# Patient Record
Sex: FEMALE | Race: WHITE | NOT HISPANIC OR LATINO | Employment: FULL TIME | URBAN - METROPOLITAN AREA
[De-identification: names, ages, dates, MRNs, and addresses within clinical notes are randomized per-mention and may not be internally consistent; named-entity substitution may affect disease eponyms.]

---

## 2021-10-11 ENCOUNTER — OFFICE VISIT (OUTPATIENT)
Dept: OBGYN CLINIC | Facility: CLINIC | Age: 46
End: 2021-10-11
Payer: COMMERCIAL

## 2021-10-11 ENCOUNTER — TELEPHONE (OUTPATIENT)
Dept: OBGYN CLINIC | Facility: CLINIC | Age: 46
End: 2021-10-11

## 2021-10-11 VITALS
SYSTOLIC BLOOD PRESSURE: 100 MMHG | HEIGHT: 64 IN | WEIGHT: 179.2 LBS | DIASTOLIC BLOOD PRESSURE: 64 MMHG | BODY MASS INDEX: 30.59 KG/M2

## 2021-10-11 DIAGNOSIS — R22.31 MASS OF RIGHT AXILLA: Primary | ICD-10-CM

## 2021-10-11 DIAGNOSIS — M79.621 PAIN IN RIGHT AXILLA: ICD-10-CM

## 2021-10-11 PROCEDURE — 99212 OFFICE O/P EST SF 10 MIN: CPT | Performed by: NURSE PRACTITIONER

## 2021-10-12 ENCOUNTER — TRANSCRIBE ORDERS (OUTPATIENT)
Dept: SCHEDULING | Age: 46
End: 2021-10-12
Payer: COMMERCIAL

## 2021-10-12 DIAGNOSIS — R22.31 LOCALIZED SWELLING, MASS AND LUMP, RIGHT UPPER LIMB: ICD-10-CM

## 2021-10-12 DIAGNOSIS — M79.621 PAIN IN RIGHT UPPER ARM: ICD-10-CM

## 2022-05-16 ENCOUNTER — TELEPHONE (OUTPATIENT)
Dept: OBGYN CLINIC | Facility: CLINIC | Age: 47
End: 2022-05-16

## 2022-05-16 NOTE — TELEPHONE ENCOUNTER
No h/o abn pap so not related to that, Could be ovarian cyst but normally pain is one sided with that  Also bowel issue is another possibility per chart review had laprscopy in past which noted "matted bowels" If pain worsening to ER for eval Normally do not do wellness with a problem visit

## 2022-05-16 NOTE — TELEPHONE ENCOUNTER
Spoke with patient let her know the message from [place]  Since talking today she has noticed the pain is more left sided  But will keep an eye on it and track of where she feels the pain for the appointment on Wednesday

## 2022-05-16 NOTE — TELEPHONE ENCOUNTER
Blessing l/m requesting c/b to discuss pain on both sides of her pelvic region  Return call to patient, l/m to c/b if needs assistance   Noted appt is scheduled with Carol Heller for 5/16/2022 for pelvic pain  and 6/29/2022 for well annual

## 2022-05-16 NOTE — TELEPHONE ENCOUNTER
Huan Rodolfo called back regarding her pelvic/abdominal pain that she is having  She started with pain in her pelvic area per walter where her "fallopian tubes are"  The pain started a week ago, it comes and goes, pain is 5/10  She is unable to describe the pain sensation that she is having  Sometimes the pain is on one side only, other times its both sides at the same time  She does not have any bleeding, or spotting with these episodes  She maybe has noticed some swelling/bloating to the area  Huan Reynolds is curious as to what could be causing this pain  She did schedule an apt on 5/18 with Fabio Gonzalez, but would like some input on could maybe cause this issue  Fabio Gonzalez,  Please advise, pt was also wondering if her Pap could be done at this visit instead of her well on 6/29 she is worried this pain could be something bad

## 2022-05-18 ENCOUNTER — OFFICE VISIT (OUTPATIENT)
Dept: OBGYN CLINIC | Facility: CLINIC | Age: 47
End: 2022-05-18
Payer: COMMERCIAL

## 2022-05-18 VITALS
BODY MASS INDEX: 31.51 KG/M2 | WEIGHT: 184.6 LBS | HEIGHT: 64 IN | DIASTOLIC BLOOD PRESSURE: 60 MMHG | SYSTOLIC BLOOD PRESSURE: 116 MMHG

## 2022-05-18 DIAGNOSIS — Z12.4 ENCOUNTER FOR SCREENING FOR CERVICAL CANCER: ICD-10-CM

## 2022-05-18 DIAGNOSIS — R10.2 PELVIC PAIN: Primary | ICD-10-CM

## 2022-05-18 PROCEDURE — 99213 OFFICE O/P EST LOW 20 MIN: CPT | Performed by: NURSE PRACTITIONER

## 2022-05-18 NOTE — PROGRESS NOTES
Assessment/Plan:  US to evaluate pelvic pain if normal recommend f/u GI       Diagnoses and all orders for this visit:    Pelvic pain  -     US pelvis complete w transvaginal; Future    Encounter for screening for cervical cancer  -     Cancel: IGP, Aptima HPV, Rfx 16/18,45  -     IGP, Aptima HPV, Rfx 16/18,45          Subjective:      Patient ID: Karina Tolentino is a 40 y o  female  Here for eval pelvic pain Bilat pain x 1-2 week where her "fallopian tubes are"  it comes and goes, pain is 5/10  She is unable to describe the pain sensation that she is having  Sometimes the pain is on one side only, other times its both sides at the same time  Does not seem to be any rhyme or reason  Can feel when rolls over in bed at night Felt in car driving here  She does not have any bleeding, or spotting with these episodes  She maybe has noticed some swelling/bloating to the area Denies fever chills Daily BM no constipation H/o c section x 2 ? Scar tissue  PAP 10/2018 neg/neg No h/o abn pap Requesting a pap be done today H/o vaginismus Not frequently SA      The following portions of the patient's history were reviewed and updated as appropriate: allergies, current medications, past family history, past medical history, past social history, past surgical history and problem list     Review of Systems   Constitutional: Negative for chills and fever  Gastrointestinal: Negative for abdominal pain, constipation and diarrhea  Genitourinary: Positive for pelvic pain  Negative for dyspareunia, dysuria, frequency, menstrual problem and vaginal discharge  Psychiatric/Behavioral: Negative for dysphoric mood  The patient is not nervous/anxious  Objective:      /60 (BP Location: Left arm, Patient Position: Sitting, Cuff Size: Large)   Ht 5' 3 5" (1 613 m)   Wt 83 7 kg (184 lb 9 6 oz)   LMP 04/12/2022 (Approximate)   Breastfeeding No   BMI 32 19 kg/m²          Physical Exam  Vitals and nursing note reviewed  Constitutional:       General: She is not in acute distress  Appearance: Normal appearance  HENT:      Head: Normocephalic and atraumatic  Pulmonary:      Effort: Pulmonary effort is normal    Abdominal:      General: There is no distension  Palpations: Abdomen is soft  There is no mass  Tenderness: There is no abdominal tenderness  Genitourinary:     General: Normal vulva  Exam position: Lithotomy position  Labia:         Right: No rash or lesion  Left: No rash or lesion  Vagina: Normal  No vaginal discharge or erythema  Cervix: No cervical motion tenderness, discharge, lesion or cervical bleeding  Uterus: Not enlarged and not tender  Adnexa:         Right: No mass or tenderness  Left: Fullness present  No mass or tenderness  Rectum: Normal       Comments: PAP from cervix  Lymphadenopathy:      Lower Body: No right inguinal adenopathy  No left inguinal adenopathy  Skin:     General: Skin is warm and dry  Neurological:      General: No focal deficit present  Mental Status: She is alert and oriented to person, place, and time  Psychiatric:         Mood and Affect: Mood normal          Behavior: Behavior normal          Thought Content:  Thought content normal

## 2022-05-23 LAB
CYTOLOGIST CVX/VAG CYTO: NORMAL
DX ICD CODE: NORMAL
HPV I/H RISK 4 DNA CVX QL PROBE+SIG AMP: NEGATIVE
OTHER STN SPEC: NORMAL
PATH REPORT.FINAL DX SPEC: NORMAL
SL AMB NOTE:: NORMAL
SL AMB SPECIMEN ADEQUACY: NORMAL
SL AMB TEST METHODOLOGY: NORMAL

## 2022-05-24 ENCOUNTER — TELEPHONE (OUTPATIENT)
Dept: OBGYN CLINIC | Facility: CLINIC | Age: 47
End: 2022-05-24

## 2022-11-16 ENCOUNTER — TELEPHONE (OUTPATIENT)
Dept: GASTROENTEROLOGY | Facility: CLINIC | Age: 47
End: 2022-11-16

## 2022-11-17 ENCOUNTER — CONSULT (OUTPATIENT)
Dept: GASTROENTEROLOGY | Facility: MEDICAL CENTER | Age: 47
End: 2022-11-17

## 2022-11-17 VITALS
HEART RATE: 74 BPM | WEIGHT: 195 LBS | SYSTOLIC BLOOD PRESSURE: 118 MMHG | BODY MASS INDEX: 33.29 KG/M2 | TEMPERATURE: 97.7 F | DIASTOLIC BLOOD PRESSURE: 72 MMHG | OXYGEN SATURATION: 98 % | HEIGHT: 64 IN

## 2022-11-17 DIAGNOSIS — R10.30 LOWER ABDOMINAL PAIN: Primary | ICD-10-CM

## 2022-11-17 NOTE — PATIENT INSTRUCTIONS
High Fiber Diet   WHAT YOU NEED TO KNOW:   A high-fiber diet includes foods that have a high amount of fiber  Fiber is the part of fruits, vegetables, and grains that is not broken down by your body  Fiber keeps your bowel movements regular  Fiber can also help lower your cholesterol level, control blood sugar in people with diabetes, and relieve constipation  Fiber can also help you control your weight because it helps you feel full faster  Most adults should eat 25 to 35 grams of fiber each day  Talk to your dietitian or healthcare provider about the amount of fiber you need  DISCHARGE INSTRUCTIONS:   Good sources of fiber:       Foods with at least 4 grams of fiber per serving:      ? to ½ cup of high-fiber cereal (check the nutrition label on the box)    ½ cup of blackberries or raspberries    4 dried prunes    1 cooked artichoke    ½ cup of cooked legumes, such as lentils, or red, kidney, and baidr beans    Foods with 1 to 3 grams of fiber per servin slice of whole-wheat, pumpernickel, or rye bread    ½ cup of cooked brown rice    4 whole-wheat crackers    1 cup of oatmeal    ½ cup of cereal with 1 to 3 grams of fiber per serving (check the nutrition label on the box)    1 small piece of fruit, such as an apple, banana, pear, kiwi, or orange    3 dates    ½ cup of canned apricots, fruit cocktail, peaches, or pears    ½ cup of raw or cooked vegetables, such as carrots, cauliflower, cabbage, spinach, squash, or corn    Ways that you can increase fiber in your diet:   Choose brown or wild rice instead of white rice  Use whole wheat flour in recipes instead of white or all-purpose flour  Add beans and peas to casseroles or soups  Choose fresh fruit and vegetables with peels or skins on instead of juices  Other diet guidelines to follow: Add fiber to your diet slowly  You may have abdominal discomfort, bloating, and gas if you add fiber to your diet too quickly       Drink plenty of liquids as you add fiber to your diet  You may have nausea or develop constipation if you do not drink enough water  Ask how much liquid to drink each day and which liquids are best for you  © Copyright [employer] 2022 Information is for End User's use only and may not be sold, redistributed or otherwise used for commercial purposes  All illustrations and images included in CareNotes® are the copyrighted property of [employer]  [facility] Javier [address]ian   The above information is an  only  It is not intended as medical advice for individual conditions or treatments  Talk to your doctor, nurse or pharmacist before following any medical regimen to see if it is safe and effective for you  Scheduled date of colon (as of today) 12/29/22  Physician performing Dr Carmen Singh:  Location of procedure  asc [facility]:   Instructions given to patient  miralax/dulcolax  Clearances: na

## 2022-11-17 NOTE — PROGRESS NOTES
Assessment/Plan:     Diagnoses and all orders for this visit:    Lower abdominal pain  Patient describes lower abdominal pain for the past 5-6 months  She states it is sporadic, last approximately 15-30 minutes and then resolve spontaneously  It does not seem to be related to eating or bowel movements  I did encourage her to get the pelvic ultrasound that was previously ordered  She is also very concerned about colon cancer given her father's history  Will also schedule colonoscopy at this time, risks and benefits were discussed  We also discussed Bentyl however feel be limited benefit as the pain resolved so quickly, she also does not wish to take medication  Will see her back after to discuss all results  -     Colonoscopy; Future          Subjective:      Patient ID: David Interiano is a 56 y o  female  HPI     Patient was referred for lower abdominal pain and a screening colonoscopy  She states the pain started 5-6 months ago  She saw her OB gyn who recommended a pelvic ultrasound however this has not been performed  She states it is random  It does not seem to be related to eating  Typically can last anywhere from 15 minutes to half an hour and then resolves on its own  She states she has a normal formed daily bowel movement  Her father did have colon polyps, she is unclear if he then developed colon cancer  She is never had a screening colonoscopy  She denies any heartburn or reflux symptoms  Patient Active Problem List   Diagnosis   • Lower abdominal pain     Allergies   Allergen Reactions   • Chlorhexidine Rash     No current outpatient medications on file prior to visit  No current facility-administered medications on file prior to visit       Family History   Problem Relation Age of Onset   • Lung cancer Mother    • Aneurysm Mother    • Colon polyps Father    • No Known Problems Maternal Grandmother    • No Known Problems Maternal Grandfather    • No Known Problems Paternal Grandmother    • No Known Problems Paternal Grandfather    • Breast cancer Maternal Aunt    • No Known Problems Daughter    • Uterine cancer Neg Hx    • Ovarian cancer Neg Hx    • Colon cancer Neg Hx      Past Medical History:   Diagnosis Date   • Anxiety    • Breast cyst, right    • Family history of brain aneurysm     saw neurology    • Ventral hernia     per Dr Tushar Jo     Social History     Socioeconomic History   • Marital status: /Civil Union     Spouse name: None   • Number of children: None   • Years of education: None   • Highest education level: None   Occupational History   • None   Tobacco Use   • Smoking status: Never   • Smokeless tobacco: Never   Vaping Use   • Vaping Use: Never used   Substance and Sexual Activity   • Alcohol use: Yes     Comment: social   • Drug use: Never   • Sexual activity: Yes     Partners: Male     Birth control/protection: None     Comment: no new partner in past year   Other Topics Concern   • None   Social History Narrative   • None     Social Determinants of Health     Financial Resource Strain: Not on file   Food Insecurity: Not on file   Transportation Needs: Not on file   Physical Activity: Not on file   Stress: Not on file   Social Connections: Not on file   Intimate Partner Violence: Not on file   Housing Stability: Not on file     Past Surgical History:   Procedure Laterality Date   •  SECTION     •  SECTION  2018   • CHOLECYSTECTOMY  2016   • HYSTEROSCOPY  2017   • LAPAROSCOPY      for pelvic pain "no endometriosis, but intestines matted"   • MAMMO (HISTORICAL) Bilateral 2018    [facility] BI-RADS 1  Negative  Scattered fibroglandular densitys 25% to 50 % glandular breast tissue         Review of Systems   Constitutional: Negative for fever  Gastrointestinal: Positive for abdominal pain  Negative for constipation, diarrhea, nausea and vomiting  Genitourinary: Negative for dysuria, frequency and hematuria     Musculoskeletal: Positive for arthralgias and myalgias  Neurological: Positive for headaches  All other systems reviewed and are negative  Objective:      /72   Pulse 74   Temp 97 7 °F (36 5 °C)   Ht 5' 3 5" (1 613 m)   Wt 88 5 kg (195 lb)   SpO2 98%   BMI 34 00 kg/m²          Physical Exam  Constitutional:       Appearance: Normal appearance  She is well-developed and well-nourished  HENT:      Head: Normocephalic and atraumatic  Eyes:      Extraocular Movements: EOM normal       Conjunctiva/sclera: Conjunctivae normal    Cardiovascular:      Rate and Rhythm: Normal rate and regular rhythm  Pulmonary:      Effort: Pulmonary effort is normal       Breath sounds: Normal breath sounds  Abdominal:      General: Bowel sounds are normal  There is no distension  Palpations: Abdomen is soft  Tenderness: There is no abdominal tenderness  Musculoskeletal:         General: Normal range of motion  Cervical back: Normal range of motion  Skin:     General: Skin is warm and dry  Neurological:      Mental Status: She is alert and oriented to person, place, and time     Psychiatric:         Mood and Affect: Mood and affect and mood normal          Behavior: Behavior normal

## 2022-12-06 ENCOUNTER — TELEPHONE (OUTPATIENT)
Dept: GASTROENTEROLOGY | Facility: CLINIC | Age: 47
End: 2022-12-06

## 2022-12-07 ENCOUNTER — TELEPHONE (OUTPATIENT)
Dept: GASTROENTEROLOGY | Facility: CLINIC | Age: 47
End: 2022-12-07

## 2022-12-07 NOTE — TELEPHONE ENCOUNTER
Pt called to r/s procedure for 12/29/ Procedure has been r/s to 1/17/23 with Dr Zarina Hunt   Pt already has instructions

## 2023-01-17 ENCOUNTER — TELEPHONE (OUTPATIENT)
Dept: GASTROENTEROLOGY | Facility: CLINIC | Age: 48
End: 2023-01-17

## 2023-02-13 ENCOUNTER — TELEPHONE (OUTPATIENT)
Dept: OBGYN CLINIC | Facility: CLINIC | Age: 48
End: 2023-02-13

## 2023-02-13 NOTE — TELEPHONE ENCOUNTER
Eric ramirez/anibal she was given an order for pelvic u/s  She did not go originally but would like to go now  Asking if order is still valid  Return call to Eric Fraga, Return call to patient, advised pelvic u/s order is still valid   Provided # for [facility] central scheduling

## 2023-02-17 ENCOUNTER — TELEPHONE (OUTPATIENT)
Dept: GASTROENTEROLOGY | Facility: CLINIC | Age: 48
End: 2023-02-17

## 2023-02-17 DIAGNOSIS — R10.30 LOWER ABDOMINAL PAIN: Primary | ICD-10-CM

## 2023-02-17 RX ORDER — SODIUM PICOSULFATE, MAGNESIUM OXIDE, AND ANHYDROUS CITRIC ACID 10; 3.5; 12 MG/160ML; G/160ML; G/160ML
LIQUID ORAL
Qty: 320 ML | Refills: 0 | Status: SHIPPED | COMMUNITY
Start: 2023-02-17

## 2023-02-17 NOTE — TELEPHONE ENCOUNTER
Spoke with pt  Confirmed proc  Pt wanted to discuss prep options  She was a little nervous about the Miralax/Dulcolax prep working but did not want the Golytely  Gave pt clenpiq sample and new instructions  She will pick them up in the office

## 2023-02-19 ENCOUNTER — HOSPITAL ENCOUNTER (OUTPATIENT)
Dept: ULTRASOUND IMAGING | Facility: HOSPITAL | Age: 48
Discharge: HOME | End: 2023-02-19

## 2023-02-19 DIAGNOSIS — R10.2 PELVIC PAIN: ICD-10-CM

## 2023-02-27 ENCOUNTER — ANESTHESIA EVENT (OUTPATIENT)
Dept: GASTROENTEROLOGY | Facility: AMBULATORY SURGERY CENTER | Age: 48
End: 2023-02-27

## 2023-02-27 ENCOUNTER — HOSPITAL ENCOUNTER (OUTPATIENT)
Dept: GASTROENTEROLOGY | Facility: AMBULATORY SURGERY CENTER | Age: 48
Discharge: HOME | End: 2023-02-27

## 2023-02-27 ENCOUNTER — NURSE TRIAGE (OUTPATIENT)
Dept: OTHER | Facility: OTHER | Age: 48
End: 2023-02-27

## 2023-02-27 ENCOUNTER — ANESTHESIA (OUTPATIENT)
Dept: GASTROENTEROLOGY | Facility: AMBULATORY SURGERY CENTER | Age: 48
End: 2023-02-27

## 2023-02-27 VITALS
HEART RATE: 67 BPM | WEIGHT: 195 LBS | TEMPERATURE: 98.6 F | HEIGHT: 64 IN | RESPIRATION RATE: 24 BRPM | BODY MASS INDEX: 33.29 KG/M2 | DIASTOLIC BLOOD PRESSURE: 54 MMHG | OXYGEN SATURATION: 97 % | SYSTOLIC BLOOD PRESSURE: 97 MMHG

## 2023-02-27 DIAGNOSIS — R10.30 LOWER ABDOMINAL PAIN: ICD-10-CM

## 2023-02-27 LAB
EXT PREGNANCY TEST URINE: NEGATIVE
EXT. CONTROL: NORMAL

## 2023-02-27 RX ORDER — LIDOCAINE HYDROCHLORIDE 10 MG/ML
INJECTION, SOLUTION EPIDURAL; INFILTRATION; INTRACAUDAL; PERINEURAL AS NEEDED
Status: DISCONTINUED | OUTPATIENT
Start: 2023-02-27 | End: 2023-02-27

## 2023-02-27 RX ORDER — PROPOFOL 10 MG/ML
INJECTION, EMULSION INTRAVENOUS AS NEEDED
Status: DISCONTINUED | OUTPATIENT
Start: 2023-02-27 | End: 2023-02-27

## 2023-02-27 RX ORDER — SODIUM CHLORIDE, SODIUM LACTATE, POTASSIUM CHLORIDE, CALCIUM CHLORIDE 600; 310; 30; 20 MG/100ML; MG/100ML; MG/100ML; MG/100ML
50 INJECTION, SOLUTION INTRAVENOUS CONTINUOUS
Status: DISCONTINUED | OUTPATIENT
Start: 2023-02-27 | End: 2023-03-03 | Stop reason: HOSPADM

## 2023-02-27 RX ADMIN — PROPOFOL 20 MG: 10 INJECTION, EMULSION INTRAVENOUS at 10:46

## 2023-02-27 RX ADMIN — PROPOFOL 150 MG: 10 INJECTION, EMULSION INTRAVENOUS at 10:35

## 2023-02-27 RX ADMIN — PROPOFOL 20 MG: 10 INJECTION, EMULSION INTRAVENOUS at 10:50

## 2023-02-27 RX ADMIN — PROPOFOL 20 MG: 10 INJECTION, EMULSION INTRAVENOUS at 10:44

## 2023-02-27 RX ADMIN — SODIUM CHLORIDE, SODIUM LACTATE, POTASSIUM CHLORIDE, CALCIUM CHLORIDE 50 ML/HR: 600; 310; 30; 20 INJECTION, SOLUTION INTRAVENOUS at 10:22

## 2023-02-27 RX ADMIN — PROPOFOL 20 MG: 10 INJECTION, EMULSION INTRAVENOUS at 10:41

## 2023-02-27 RX ADMIN — PROPOFOL 20 MG: 10 INJECTION, EMULSION INTRAVENOUS at 10:48

## 2023-02-27 RX ADMIN — LIDOCAINE HYDROCHLORIDE 50 MG: 10 INJECTION, SOLUTION EPIDURAL; INFILTRATION; INTRACAUDAL; PERINEURAL at 10:35

## 2023-02-27 RX ADMIN — PROPOFOL 30 MG: 10 INJECTION, EMULSION INTRAVENOUS at 10:38

## 2023-02-27 NOTE — ANESTHESIA PREPROCEDURE EVALUATION
Procedure:  COLONOSCOPY    Relevant Problems   NEURO/PSYCH   (+) Anxiety        Physical Exam    Airway    Mallampati score: II  TM Distance: >3 FB  Neck ROM: full     Dental   No notable dental hx     Cardiovascular      Pulmonary      Other Findings        Anesthesia Plan  ASA Score- 1     Anesthesia Type- IV sedation with anesthesia with ASA Monitors  Additional Monitors:   Airway Plan:           Plan Factors-    Chart reviewed  Patient summary reviewed  Patient is not a current smoker  Induction- intravenous  Postoperative Plan-     Informed Consent- Anesthetic plan and risks discussed with patient  I personally reviewed this patient with the CRNA  Discussed and agreed on the Anesthesia Plan with the CRNA  Nivia Cushing

## 2023-02-27 NOTE — TELEPHONE ENCOUNTER
Patient states that she did not get all 5 glasses of water in this morning  patient was advised to stop drinking as her apt time is for 1015 and prep should be completed 4 hours prior to her procedure  Last glass of water she had was at [facility]  Patient also states that no on called her to tell her what time her arrival time was  Office please advise  Reason for Disposition  • RN needs further essential information from caller in order to complete triage    Answer Assessment - Initial Assessment Questions  1  REASON FOR CALL or QUESTION:      Patient states that she did not get all 5 glasses of water in this morning  patient was advised to stop drinking as her apt time is for 1015 and prep should be completed 4 hours prior to her procedure  Last glass of water she had was at [facility]  Patient also states that no on called her to tell her what time her arrival time was      Protocols used: INFORMATION ONLY CALL - NO TRIAGE-ADULT-

## 2023-02-27 NOTE — TELEPHONE ENCOUNTER
I asked Reginaldo Meade RN at [place] [id number] to talk to patient-Ann confirmed she would return pt's call

## 2023-02-27 NOTE — ANESTHESIA POSTPROCEDURE EVALUATION
Post-Op Assessment Note    CV Status:  Stable  Pain Score: 0    Pain management: adequate     Mental Status:  Sleepy   Hydration Status:  Euvolemic   PONV Controlled:  None   Airway Patency:  Patent      Post Op Vitals Reviewed: Yes      Staff: Anesthesiologist, CRNA         No notable events documented      BP   125/76   Temp      Pulse  74   Resp   12   SpO2   100

## 2023-03-01 ENCOUNTER — TELEPHONE (OUTPATIENT)
Dept: OBGYN CLINIC | Facility: CLINIC | Age: 48
End: 2023-03-01

## 2023-03-01 DIAGNOSIS — R93.5 ABNORMAL ULTRASOUND OF ENDOMETRIUM: Primary | ICD-10-CM

## 2023-03-01 NOTE — TELEPHONE ENCOUNTER
Called pt to discuss US ordered in May  She states she has still been having pain  She underwent a colonoscopy which was normal with the exception of a small polyp  She had described the pain in her ovaries intermittent  She proceeded with US Ovaries are normal Uterus normal with the exception of heterogenous endometrium for which they recommended repeat US in 6-8 weeks  Informed pt best time to get US is day 7-10 of menstrual cycle  Lining is not thickened at 9 mm  She notes periods are monthly Last 2 days and over  Denies bleeding between periods   Pt very anxious Discussed endometrial biopsy which she would like to proceed with Order placed for repeat US

## 2023-03-02 ENCOUNTER — TELEPHONE (OUTPATIENT)
Dept: OBGYN CLINIC | Facility: CLINIC | Age: 48
End: 2023-03-02

## 2023-03-02 NOTE — TELEPHONE ENCOUNTER
Blessing ramirez/anibal Wednesday evening has questions about EMB  Return call to Rene Ravi, answered questions pertaining to difference of pap and EMB  Encouraged to take motrin 600mg one hour prior to procedure  Eat light breakfast or lunch, want her to have had something to eat day of procedure  Patient verbalized understanding  She would also like to move appt to a sooner date if possible due to feeling anxious  Transferred to reception to assist with appointment

## 2023-03-02 NOTE — PROGRESS NOTES
Assessment/Plan:    Lower abdominal pain  55 yo  with complaints of intermittent lower abdominal pain since 2023  Was seen by GI in 2022 with records reviewed and  description of pain lasting 15-30 minutes with spontaneous resolution not related to eating or bowels  Normal colonoscopy 2023  Evaluated by our NP in 2023 with description of pain as both uni and bilateral at times when rolling over in bed  Of note is 30 lb weight gain since   Upsetting to pt, attributes to stress eating with youngest child's cardiac surgery  Describes pain as dull, mild and intermittent  No nausea, vomiting, diarrhea, constipation, blood in stool, hematuria, constant urinary pressure, early satiety,    Reviewed films with pt of pelvic u/s showing normal uterus with 9 mm heterogeneous EMS with small insignificant calcification  Normal ovaries bilaterally  Pt with normal, light, monthly cycles  Etiology of pain difficult to ascertain  Doubt gyn or gi as source with clinical picture and normal testing  Doubt endometriosis as pain started one year ago, would expect earlier symptoms from this 56year old with normal cycles  Pain most consistent with musculoskeletal pain  Exam significant for tenderness on bilateral SI joints  Recommend comfort measures (heat, positioning) and NSAIDs with PCP evaluation for possible imagining and referral to PT or pain management  Endometrial thickening on ultrasound  Images reviewed with tiny endometrial calcification and 9 mm EMS with normal cycles  No indication for biopsy at this time  Will repeat u/s in 3 months and reassess need for intervention at that time  Orders given         Diagnoses and all orders for this visit:    Negative pregnancy test  -     POCT urine HCG    Endometrial thickening on ultrasound  -     US pelvis complete w transvaginal; Future    Encounter for screening mammogram for malignant neoplasm of breast  -     Mammo screening bilateral w 3d & cad; Future    Lower abdominal pain    Other orders  -     Liquid-based pap, screening          Subjective:      Patient ID: Uday Miller is a 56 y o  female  HPI here with abdominal pain and to review imaging  The following portions of the patient's history were reviewed and updated as appropriate:   She  has a past medical history of Anxiety, Breast cyst, right, Family history of brain aneurysm, and Ventral hernia  She   Patient Active Problem List    Diagnosis Date Noted   • Endometrial thickening on ultrasound 2023   • Anxiety    • Lower abdominal pain 2022     She  has a past surgical history that includes Mammo (historical) (Bilateral, 2018);  section (); Cholecystectomy (); Hysteroscopy ();  section (); LAPAROSCOPY; and Colonoscopy (2023)  Her family history includes Aneurysm in her mother; Breast cancer in her maternal aunt; Colon polyps in her father; Lung cancer in her mother; No Known Problems in her daughter, maternal grandfather, maternal grandmother, paternal grandfather, and paternal grandmother  She  reports that she has never smoked  She has never used smokeless tobacco  She reports current alcohol use  She reports that she does not use drugs  No current outpatient medications on file  No current facility-administered medications for this visit  She is allergic to chlorhexidine       Review of Systems  see above    Objective:      BP 90/62   Ht 5' 3 5" (1 613 m)   Wt 89 7 kg (197 lb 12 8 oz)   LMP 2023 (Approximate)   BMI 34 49 kg/m²          Physical Exam    General appearance: no distress, pleasant, anxious  Abdomen: soft, non tender, no palpable masses, well healed pfannenstiel scar   No guarding or rebound  Back: tender over bilateral SI joints with minimal pressure  Pelvic exam: **sensitive with exam and spec**  normal external genitalia, right labia majora 1 cm inclusion cyst, mobile, urethral meatus normal, vagina without lesions,  cervix without lesions, no CMT,  uterus small, non tender, no adnexal masses, non tender  Rectal exam: deferred    DATA: 2/19/23 u/s with AV uterus 9 7 cm with 9 mm heterogeneous with small calcification EMS   R ov 3 and L ov 2 8 cm; both WNL

## 2023-03-03 ENCOUNTER — OFFICE VISIT (OUTPATIENT)
Dept: OBGYN CLINIC | Facility: CLINIC | Age: 48
End: 2023-03-03

## 2023-03-03 VITALS
HEIGHT: 64 IN | SYSTOLIC BLOOD PRESSURE: 90 MMHG | WEIGHT: 197.8 LBS | DIASTOLIC BLOOD PRESSURE: 62 MMHG | BODY MASS INDEX: 33.77 KG/M2

## 2023-03-03 DIAGNOSIS — Z32.02 NEGATIVE PREGNANCY TEST: ICD-10-CM

## 2023-03-03 DIAGNOSIS — R93.89 ENDOMETRIAL THICKENING ON ULTRASOUND: ICD-10-CM

## 2023-03-03 DIAGNOSIS — Z12.31 ENCOUNTER FOR SCREENING MAMMOGRAM FOR MALIGNANT NEOPLASM OF BREAST: ICD-10-CM

## 2023-03-03 DIAGNOSIS — R10.30 LOWER ABDOMINAL PAIN: Primary | ICD-10-CM

## 2023-03-03 LAB — SL AMB POCT URINE HCG: NEGATIVE

## 2023-03-03 NOTE — ASSESSMENT & PLAN NOTE
57 yo  with complaints of intermittent lower abdominal pain since 2023  Was seen by GI in 2022 with records reviewed and  description of pain lasting 15-30 minutes with spontaneous resolution not related to eating or bowels  Normal colonoscopy 2023  Evaluated by our NP in 2023 with description of pain as both uni and bilateral at times when rolling over in bed  Of note is 30 lb weight gain since   Upsetting to pt, attributes to stress eating with youngest child's cardiac surgery  Describes pain as dull, mild and intermittent  No nausea, vomiting, diarrhea, constipation, blood in stool, hematuria, constant urinary pressure, early satiety,    Reviewed films with pt of pelvic u/s showing normal uterus with 9 mm heterogeneous EMS with small insignificant calcification  Normal ovaries bilaterally  Pt with normal, light, monthly cycles  Etiology of pain difficult to ascertain  Doubt gyn or gi as source with clinical picture and normal testing  Doubt endometriosis as pain started one year ago, would expect earlier symptoms from this 56year old with normal cycles  Pain most consistent with musculoskeletal pain  Exam significant for tenderness on bilateral SI joints  Recommend comfort measures (heat, positioning) and NSAIDs with PCP evaluation for possible imagining and referral to PT or pain management

## 2023-03-03 NOTE — LETTER
March 3, 2023     Jaimie Matthew MD  [id number]  [facility] [address]    Patient: Lianna Jose   YOB: 1975   Date of Visit: 3/3/2023       Dear Dr Lady Washington: Thank you for referring Clifford Moulton to me for evaluation  Below are my notes for this consultation  If you have questions, please do not hesitate to call me  I look forward to following your patient along with you  Sincerely,        Moreno Naidu MD        CC: No Recipients  Moreno Naidu MD  3/3/2023  4:39 PM  Sign when Signing Visit  Assessment/Plan:    Lower abdominal pain  55 yo  with complaints of intermittent lower abdominal pain since 2023  Was seen by GI in 2022 with records reviewed and  description of pain lasting 15-30 minutes with spontaneous resolution not related to eating or bowels  Normal colonoscopy 2023  Evaluated by our NP in 2023 with description of pain as both uni and bilateral at times when rolling over in bed  Of note is 30 lb weight gain since   Upsetting to pt, attributes to stress eating with youngest child's cardiac surgery  Describes pain as dull, mild and intermittent  No nausea, vomiting, diarrhea, constipation, blood in stool, hematuria, constant urinary pressure, early satiety,    Reviewed films with pt of pelvic u/s showing normal uterus with 9 mm heterogeneous EMS with small insignificant calcification  Normal ovaries bilaterally  Pt with normal, light, monthly cycles  Etiology of pain difficult to ascertain  Doubt gyn or gi as source with clinical picture and normal testing  Doubt endometriosis as pain started one year ago, would expect earlier symptoms from this 56year old with normal cycles  Pain most consistent with musculoskeletal pain  Exam significant for tenderness on bilateral SI joints  Recommend comfort measures (heat, positioning) and NSAIDs with PCP evaluation for possible imagining and referral to PT or pain management  Endometrial thickening on ultrasound  Images reviewed with tiny endometrial calcification and 9 mm EMS with normal cycles  No indication for biopsy at this time  Will repeat u/s in 3 months and reassess need for intervention at that time  Orders given  Diagnoses and all orders for this visit:    Negative pregnancy test  -     POCT urine HCG    Endometrial thickening on ultrasound  -     US pelvis complete w transvaginal; Future    Encounter for screening mammogram for malignant neoplasm of breast  -     Mammo screening bilateral w 3d & cad; Future    Lower abdominal pain    Other orders  -     Liquid-based pap, screening         Subjective:     Patient ID: Ochoa Norman is a 56 y o  female  HPI here with abdominal pain and to review imaging  The following portions of the patient's history were reviewed and updated as appropriate:   She  has a past medical history of Anxiety, Breast cyst, right, Family history of brain aneurysm, and Ventral hernia  She   Patient Active Problem List    Diagnosis Date Noted   • Endometrial thickening on ultrasound 2023   • Anxiety    • Lower abdominal pain 2022     She  has a past surgical history that includes Mammo (historical) (Bilateral, 2018);  section (); Cholecystectomy (); Hysteroscopy ();  section (); LAPAROSCOPY; and Colonoscopy (2023)  Her family history includes Aneurysm in her mother; Breast cancer in her maternal aunt; Colon polyps in her father; Lung cancer in her mother; No Known Problems in her daughter, maternal grandfather, maternal grandmother, paternal grandfather, and paternal grandmother  She  reports that she has never smoked  She has never used smokeless tobacco  She reports current alcohol use  She reports that she does not use drugs  No current outpatient medications on file  No current facility-administered medications for this visit       She is allergic to chlorhexidine       Review of Systems see above    Objective:      BP 90/62   Ht 5' 3 5" (1 613 m)   Wt 89 7 kg (197 lb 12 8 oz)   LMP 02/01/2023 (Approximate)   BMI 34 49 kg/m²         Physical Exam   General appearance: no distress, pleasant, anxious  Abdomen: soft, non tender, no palpable masses, well healed pfannenstiel scar  No guarding or rebound  Back: tender over bilateral SI joints with minimal pressure  Pelvic exam: **sensitive with exam and spec**  normal external genitalia, right labia majora 1 cm inclusion cyst, mobile, urethral meatus normal, vagina without lesions,  cervix without lesions, no CMT,  uterus small, non tender, no adnexal masses, non tender  Rectal exam: deferred    DATA: 2/19/23 u/s with AV uterus 9 7 cm with 9 mm heterogeneous with small calcification EMS   R ov 3 and L ov 2 8 cm; both WNL

## 2023-03-03 NOTE — ASSESSMENT & PLAN NOTE
Images reviewed with tiny endometrial calcification and 9 mm EMS with normal cycles  No indication for biopsy at this time  Will repeat u/s in 3 months and reassess need for intervention at that time  Orders given

## 2023-05-01 DIAGNOSIS — Z12.31 ENCOUNTER FOR SCREENING MAMMOGRAM FOR MALIGNANT NEOPLASM OF BREAST: ICD-10-CM

## 2023-06-20 ENCOUNTER — TELEPHONE (OUTPATIENT)
Dept: OBGYN CLINIC | Facility: CLINIC | Age: 48
End: 2023-06-20

## 2023-06-20 DIAGNOSIS — Z12.31 ENCOUNTER FOR SCREENING MAMMOGRAM FOR MALIGNANT NEOPLASM OF BREAST: Primary | ICD-10-CM

## 2023-06-20 NOTE — TELEPHONE ENCOUNTER
Thank you. Glad the pain has resolved. She should schedule a mammogram (due and order placed) and schedule a well check (DK or me) in the next 3-4 months.   Please send mammo order to her.   Thanks.

## 2023-06-20 NOTE — TELEPHONE ENCOUNTER
6/20/23-Pt states she does not need this f/u appt.  Her original symptom (pain) has not returned and she was not able to schedule the f/u US. She will certainly follow thru with the original orders and RS this appt if you want her to.  Pls advise.

## 2023-12-11 ENCOUNTER — TELEPHONE (OUTPATIENT)
Dept: OBGYN CLINIC | Facility: CLINIC | Age: 48
End: 2023-12-11

## 2023-12-11 NOTE — TELEPHONE ENCOUNTER
Blessing ramirez/anibal asking when her prior mammogram was done. Return call to Linsey Gallego, previous mammo done 4/15/2023. She reports she is having slight bilateral breast discomfort. Has had same in past and mammo was normal. Denies breast lumps,redness. One breast larger than other whch is normal for her. H/O fibroglandular densities. Reports increased caffeine intake. Offered breast exam and encourgaed to schedule well annual.  She is in agreement to schedule well annual. Will consider breast check appointment if discomfort does not resolve or develops any new concerns.  Transferred to reception to schedule well annual and option for breast check exam.

## 2024-03-19 PROBLEM — Z01.419 ENCOUNTER FOR GYNECOLOGICAL EXAMINATION (GENERAL) (ROUTINE) WITHOUT ABNORMAL FINDINGS: Status: ACTIVE | Noted: 2024-03-19

## 2024-04-16 ENCOUNTER — HOSPITAL ENCOUNTER (OUTPATIENT)
Dept: MAMMOGRAPHY | Facility: IMAGING CENTER | Age: 49
Discharge: HOME | End: 2024-04-16
Payer: COMMERCIAL

## 2024-04-16 VITALS — WEIGHT: 185 LBS | BODY MASS INDEX: 31.58 KG/M2 | HEIGHT: 64 IN

## 2024-04-16 DIAGNOSIS — Z12.31 ENCOUNTER FOR SCREENING MAMMOGRAM FOR MALIGNANT NEOPLASM OF BREAST: ICD-10-CM

## 2024-04-16 PROCEDURE — 77067 SCR MAMMO BI INCL CAD: CPT

## 2024-04-16 PROCEDURE — 77063 BREAST TOMOSYNTHESIS BI: CPT
